# Patient Record
Sex: FEMALE | NOT HISPANIC OR LATINO
[De-identification: names, ages, dates, MRNs, and addresses within clinical notes are randomized per-mention and may not be internally consistent; named-entity substitution may affect disease eponyms.]

---

## 2023-12-05 ENCOUNTER — NON-APPOINTMENT (OUTPATIENT)
Age: 48
End: 2023-12-05

## 2023-12-06 ENCOUNTER — APPOINTMENT (OUTPATIENT)
Dept: UROLOGY | Facility: CLINIC | Age: 48
End: 2023-12-06
Payer: COMMERCIAL

## 2023-12-06 VITALS
TEMPERATURE: 97 F | WEIGHT: 100 LBS | OXYGEN SATURATION: 100 % | BODY MASS INDEX: 18.88 KG/M2 | DIASTOLIC BLOOD PRESSURE: 75 MMHG | HEART RATE: 71 BPM | HEIGHT: 61 IN | SYSTOLIC BLOOD PRESSURE: 107 MMHG

## 2023-12-06 DIAGNOSIS — Z85.118 PERSONAL HISTORY OF OTHER MALIGNANT NEOPLASM OF BRONCHUS AND LUNG: ICD-10-CM

## 2023-12-06 DIAGNOSIS — R35.0 FREQUENCY OF MICTURITION: ICD-10-CM

## 2023-12-06 DIAGNOSIS — Z78.9 OTHER SPECIFIED HEALTH STATUS: ICD-10-CM

## 2023-12-06 DIAGNOSIS — R35.1 NOCTURIA: ICD-10-CM

## 2023-12-06 DIAGNOSIS — Z87.39 PERSONAL HISTORY OF OTHER DISEASES OF THE MUSCULOSKELETAL SYSTEM AND CONNECTIVE TISSUE: ICD-10-CM

## 2023-12-06 PROBLEM — Z00.00 ENCOUNTER FOR PREVENTIVE HEALTH EXAMINATION: Status: ACTIVE | Noted: 2023-12-06

## 2023-12-06 PROCEDURE — 99204 OFFICE O/P NEW MOD 45 MIN: CPT | Mod: 25

## 2023-12-06 PROCEDURE — 51798 US URINE CAPACITY MEASURE: CPT

## 2023-12-06 RX ORDER — OSIMERTINIB 80 1/1
TABLET, FILM COATED ORAL
Refills: 0 | Status: ACTIVE | COMMUNITY

## 2023-12-06 RX ORDER — OSIMERTINIB 40 1/1
40 TABLET, FILM COATED ORAL
Refills: 0 | Status: ACTIVE | COMMUNITY

## 2023-12-07 ENCOUNTER — TRANSCRIPTION ENCOUNTER (OUTPATIENT)
Age: 48
End: 2023-12-07

## 2023-12-08 ENCOUNTER — TRANSCRIPTION ENCOUNTER (OUTPATIENT)
Age: 48
End: 2023-12-08

## 2024-01-23 ENCOUNTER — APPOINTMENT (OUTPATIENT)
Dept: UROLOGY | Facility: CLINIC | Age: 49
End: 2024-01-23
Payer: COMMERCIAL

## 2024-01-23 VITALS
SYSTOLIC BLOOD PRESSURE: 101 MMHG | OXYGEN SATURATION: 100 % | DIASTOLIC BLOOD PRESSURE: 69 MMHG | TEMPERATURE: 98.1 F | HEART RATE: 71 BPM

## 2024-01-23 PROCEDURE — 99213 OFFICE O/P EST LOW 20 MIN: CPT

## 2024-01-23 NOTE — HISTORY OF PRESENT ILLNESS
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

## 2024-01-23 NOTE — ASSESSMENT
[FreeTextEntry1] : Ms [address] is a 48 year old single sexually active woman who presents with a complaint of urinary frequency and nocturia.  She notes that the symptoms began after her diagnosis of lung carcinoma and subsequent chemotherapy.  She was treated with cisplastin and pemetrexed Neither of these are associated with  symptomatology.  She currently is on tagrisso which similarly does not have  toxicity as a listed consequence. She previously was seen by a nurse practitioner who diagnosed her with overactive bladder.  Recent urinalysis is normal without any red blood cells etc. At this point we plan to proceed with a voiding diary so we can assess her symptoms related to her fluid consumption etc.  We also discussed basic principles of behavior modification.  She will do a voiding diary prebehavior modification and following behavior modification strategies.  We discussed the potential utilization of antimuscarinic agents.  She is unwilling to take these at this point.  plan voiding diary fu to assess and repeat urines studies [address]  expressed fully understanding of the information provided, the consequences and the management.  1.23.2024 Patient returns concerning urinary frequency.  Her voiding diary was reviewed.  We discussed the diurnal variation.  We discussed the fact that she clusters her fluid intake in the morning.  To her voided volume is as improved with increased labs with is much as 570  cc in a single void.  She is aware of the beneficial effect of delaying first urge to urinate and recognize the improvement.  We discussed consumed and voided volume.She did have 1 episode of nocturnal enuresis.  We discussed nocturia and the physiology of this.  We discussed sleep patterns and strategies to decrease nocturia including reduced fluid intake after evening meal and sleep strategies as well. Her diary of 1/13/2024 demonstrated voids from 200 to 500 cc without frequency and commencement with her fluid intake. Patient recognizes that she has made progress.  She is not interested in medical management.  We discussed referral to voiding dysfunction specialist.  At this point she feels that she has made progress and will continue to employ behavior modification strategies as discussed